# Patient Record
(demographics unavailable — no encounter records)

---

## 2025-03-13 NOTE — HISTORY OF PRESENT ILLNESS
[FreeTextEntry1] :   Pediatric & Adolescent Gynecology: New Patient Visit   NAVEEN is a(n) 5 year female ( Sep 11 2019) presenting for hypopigmentation and vaginal adhesions   [facility] P[facility]: Unable to Collect PCP   Review of history from records:   Today 2025: pt is here with  Symptoms:  - burning with urination, seen at [facility] ED  for sxs (UA was normal and was then discharged) - per peds note, "hypopigmentation of inner aspect of labial majora and minora. Thick labial adhesions. White discoloration of vaginal area"  Duration and/or frequency:   Current or previous treatments:  Alleviating or exacerbating factors:

## 2025-04-11 NOTE — HISTORY OF PRESENT ILLNESS
[FreeTextEntry1] :   Pediatric & Adolescent Gynecology: New Patient Visit   ELISABETH is a(n) 5 year female ( Sep 11 2019) presenting for hypopigmentation and vaginal adhesions   Referred by: MARGARITA CHOWDHURY,[facility] PCP: Unable to Collect PCP   Review of history from records:   Today 2025: pt is here with parents   Symptoms:  - burning with urination, seen at [facility] ED  for sxs (UA was normal and was then discharged) - per peds note, "hypopigmentation of inner aspect of labial majora and minora. Thick labial adhesions. White discoloration of vaginal area" - mom states that Elisabeth will complain of pain but cannot describe it  Duration and/or frequency:  - 6-8 months  - was taken to the ER multiple times   Current or previous treatments: None  Alleviating or exacerbating factors: - using the bathroom to urinate (and sometimes poop) will cause the pain to flare up   Has been having sharp pains of the vulva area and constantly says "my vagina hurts." Never had a urinary tract infection. Potty trained at 2 years old, no issues with potty training. Elisabeth denies leakage of urine, no bet wetting. No vaginal discharge. Does hurt to urinate. No constipation.

## 2025-04-11 NOTE — PLAN
[FreeTextEntry1] : Thank you for seeing us today! You did a great job with your exam!  >  We will contact you with results of the urinalysis and culture >  Please use a pinpoint amount of the clobetasol ointment at the areas we discussed. Twice per day until your follow up visit with us.      - Please follow up with us in 6 weeks. You can make the appointment at the .     - Please call us if Elisabeth experiences any side effects from the medication.  >  After showering, barrier emollients such as Vaseline or Aquaphor ointment, can be applied to the vulva.  >  Sitz baths can be used nightly while irritation persists; typically irritation improves significantly within 1-2 weeks of treatment with clobetasol  Follow-up: - Please schedule your next appointment with Dr. Chu in about 4-6 weeks in person at [facility] location.   To contact the Pediatric & Adolescent Gynecology team: - phone: [phone] -- option 1 for appointments, option 2 for clinical questions - patient portal (available for ages <13 or 18+ through [employer]/website) - email: [email or web address] (for non-urgent requests/records)  Appointment locations: - Tuesdays: [address] [facility], [address], [facility] [address] ([facility]) - Fridays: [address] [place], [place]

## 2025-04-11 NOTE — PLAN
[FreeTextEntry1] : Thank you for seeing us today! You did a great job with your exam!  >  We will contact you with results of the urinalysis and culture >  Please use a pinpoint amount of the clobetasol ointment at the areas we discussed. Twice per day until your follow up visit with us.      - Please follow up with us in 6 weeks. You can make the appointment at the .     - Please call us if Elisabeth experiences any side effects from the medication.  >  After showering, barrier emollients such as Vaseline or Aquaphor ointment, can be applied to the vulva.  >  Sitz baths can be used nightly while irritation persists; typically irritation improves significantly within 1-2 weeks of treatment with clobetasol  Follow-up: - Please schedule your next appointment with Dr. Chu in about 4-6 weeks in person at [facility] location.   To contact the Pediatric & Adolescent Gynecology team: - phone: [phone] -- option 1 for appointments, option 2 for clinical questions - patient portal (available for ages <13 or 18+ through [employer]/website) - email: [email or web address] (for non-urgent requests/records)  Appointment locations: - Tuesdays: [address] [facility], [address], [facility] [address] ([facility]) - Fridays: [address] [place], [place]

## 2025-04-11 NOTE — HISTORY OF PRESENT ILLNESS
[FreeTextEntry1] :   Pediatric & Adolescent Gynecology: New Patient Visit   ELISABETH is a(n) 5 year female ( Sep 11 2019) presenting for hypopigmentation and vaginal adhesions   Referred by: MARGARITA CHOWDHURY,[facility] PCP: Unable to Collect PCP   Review of history from records:   Today 2025: pt is here with parents   Symptoms:  - burning with urination, seen at [facility] ED  for sxs (UA was normal and was then discharged) - per peds note, "hypopigmentation of inner aspect of labial majora and minora. Thick labial adhesions. White discoloration of vaginal area" - mom states that Elisabeth will complain of pain but cannot describe it  Duration and/or frequency:  - 6-8 months  - was taken to the ER multiple times   Current or previous treatments: None  Alleviating or exacerbating factors: - using the bathroom to urinate (and sometimes poop) will cause the pain to flare up   Has been having sharp pains of the vulva area and constantly says "my vagina hurts." Never had a urinary tract infection. Potty trained at 2 years old, no issues with potty training. Elisabeth denies leakage of urine, no bet wetting. No vaginal discharge. Does hurt to urinate. No constipation.

## 2025-04-11 NOTE — ASSESSMENT
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  - Exam findings are consistent with a diagnosis of vulvar lichen sclerosus which is an inflammatory dermatologic condition that affects the anogenital area. It is common in postmenopausal women but also presents in prepubertal girls. While there is increased risk of squamous cell carcinoma in postmenopausal women with lichen sclerosus, research currently cannot elucidate if there is an increased risk in young girls. Skin biopsy is not recommended but long term follow up is advised. Lichen sclerosus is treated with high potency topical steroids until the symptoms resolve. It is a chronic condition and repeat courses of treatment are often needed. - Instructions were provided instructed to apply Clobetasol ointment to the affected vulvar skin twice daily until follow up with me in 406 weeks.  They will return to clinic for re-evaluation prior to proceeding with down-titration of medication.  - General vulvar hygiene was also reviewed. After showering, barrier emollients such as Vaseline or Aquaphor ointment can be applied to the vulva.  - Sitz baths can be used nightly while irritation persists; typically irritation improves significantly within 1-2 weeks of treatment with clobetasol - RTO in 4-6 weeks.

## 2025-05-22 NOTE — HISTORY OF PRESENT ILLNESS
[FreeTextEntry1] :   Pediatric & Adolescent Gynecology: Return Patient Visit   ELISABETH is a(n) 5 year female ( Sep 11 2019) presenting for hypopigmentation and vaginal adhesions, likely lichen sclerosis    Referred by: MARGARITA CHOWDHURY,[facility] PCP: Unable to Collect PCP   Review of history from records:   Initial visit 2025: pt is here with parents   Symptoms:  - burning with urination, seen at [facility] ED  for sxs (UA was normal and was then discharged) - per peds note, "hypopigmentation of inner aspect of labial majora and minora. Thick labial adhesions. White discoloration of vaginal area" - mom states that Elisabeth will complain of pain but cannot describe it  Duration and/or frequency:  - 6-8 months  - was taken to the ER multiple times   Current or previous treatments: None  Alleviating or exacerbating factors: - using the bathroom to urinate (and sometimes poop) will cause the pain to flare up   Has been having sharp pains of the vulva area and constantly says "my vagina hurts." Never had a urinary tract infection. Potty trained at 2 years old, no issues with potty training. Elisabeth denies leakage of urine, no bet wetting. No vaginal discharge. Does hurt to urinate. No constipation.    TODAY 2025: pr is here with   Using clobetasol twice a day at